# Patient Record
Sex: MALE | Race: WHITE | URBAN - METROPOLITAN AREA
[De-identification: names, ages, dates, MRNs, and addresses within clinical notes are randomized per-mention and may not be internally consistent; named-entity substitution may affect disease eponyms.]

---

## 2019-11-20 PROBLEM — 84089009 HIATAL HERNIA: Status: ACTIVE | Noted: 2019-11-20

## 2019-11-20 PROBLEM — 14304000 THYROID DISORDER: Status: ACTIVE | Noted: 2019-11-20

## 2019-11-20 PROBLEM — 90560007 GOUT: Status: ACTIVE | Noted: 2019-11-20

## 2020-09-23 ENCOUNTER — OFFICE VISIT (OUTPATIENT)
Dept: URBAN - METROPOLITAN AREA CLINIC 13 | Facility: CLINIC | Age: 70
End: 2020-09-23

## 2020-09-23 PROBLEM — 13644009 HYPERCHOLESTEROLEMIA: Status: ACTIVE | Noted: 2020-09-23

## 2020-09-24 ENCOUNTER — OFFICE VISIT (OUTPATIENT)
Dept: URBAN - METROPOLITAN AREA CLINIC 13 | Facility: CLINIC | Age: 70
End: 2020-09-24

## 2020-10-02 ENCOUNTER — OFFICE VISIT (OUTPATIENT)
Dept: URBAN - METROPOLITAN AREA SURGERY CENTER 28 | Facility: SURGERY CENTER | Age: 70
End: 2020-10-02

## 2020-10-02 PROBLEM — 428283002 HISTORY OF POLYP OF COLON: Status: ACTIVE | Noted: 2020-10-02

## 2020-10-02 PROBLEM — 90458007 INTERNAL HEMORRHOIDS: Status: ACTIVE | Noted: 2020-10-02

## 2020-10-02 LAB — PDFREPORT1: (no result)

## 2020-10-07 ENCOUNTER — LAB OUTSIDE AN ENCOUNTER (OUTPATIENT)
Dept: URBAN - METROPOLITAN AREA CLINIC 13 | Facility: CLINIC | Age: 70
End: 2020-10-07

## 2020-10-12 ENCOUNTER — OFFICE VISIT (OUTPATIENT)
Dept: URBAN - METROPOLITAN AREA CLINIC 13 | Facility: CLINIC | Age: 70
End: 2020-10-12

## 2021-01-07 ENCOUNTER — OFFICE VISIT (OUTPATIENT)
Dept: URBAN - METROPOLITAN AREA CLINIC 46 | Facility: CLINIC | Age: 71
End: 2021-01-07

## 2021-08-28 ENCOUNTER — TELEPHONE ENCOUNTER (OUTPATIENT)
Dept: URBAN - METROPOLITAN AREA CLINIC 13 | Facility: CLINIC | Age: 71
End: 2021-08-28

## 2021-08-28 RX ORDER — OMEPRAZOLE 40 MG/1
CAPSULE, DELAYED RELEASE ORAL
OUTPATIENT
End: 2021-01-07

## 2021-08-28 RX ORDER — LEVOTHYROXINE SODIUM 75 UG/1
TABLET ORAL
OUTPATIENT
End: 2020-09-23

## 2021-08-28 RX ORDER — FEBUXOSTAT 40 MG/1
TABLET ORAL
OUTPATIENT
End: 2020-09-23

## 2021-08-29 ENCOUNTER — TELEPHONE ENCOUNTER (OUTPATIENT)
Dept: URBAN - METROPOLITAN AREA CLINIC 13 | Facility: CLINIC | Age: 71
End: 2021-08-29

## 2021-08-29 RX ORDER — ATORVASTATIN CALCIUM 40 MG/1
TABLET ORAL
Status: ACTIVE | COMMUNITY

## 2021-08-29 RX ORDER — LEVOTHYROXINE SODIUM 75 UG/1
TABLET ORAL
Status: ACTIVE | COMMUNITY

## 2021-08-29 RX ORDER — FEBUXOSTAT 40 MG/1
TABLET, FILM COATED ORAL
Status: ACTIVE | COMMUNITY

## 2021-08-29 RX ORDER — OMEPRAZOLE 20 MG/1
TABLET, DELAYED RELEASE ORAL
Status: ACTIVE | COMMUNITY
Start: 2020-10-02

## 2021-08-29 RX ORDER — FERROUS SULFATE 324(65)MG
TABLET, DELAYED RELEASE (ENTERIC COATED) ORAL
Status: ACTIVE | COMMUNITY

## 2021-08-29 RX ORDER — ASPIRIN 81 MG/1
TABLET, COATED ORAL
Status: ACTIVE | COMMUNITY

## 2024-03-07 ENCOUNTER — OV EP (OUTPATIENT)
Dept: URBAN - METROPOLITAN AREA CLINIC 46 | Facility: CLINIC | Age: 74
End: 2024-03-07
Payer: MEDICARE

## 2024-03-07 ENCOUNTER — OV NP (OUTPATIENT)
Dept: URBAN - METROPOLITAN AREA CLINIC 46 | Facility: CLINIC | Age: 74
End: 2024-03-07

## 2024-03-07 VITALS
BODY MASS INDEX: 24.58 KG/M2 | HEART RATE: 83 BPM | SYSTOLIC BLOOD PRESSURE: 136 MMHG | WEIGHT: 175.6 LBS | TEMPERATURE: 97.7 F | DIASTOLIC BLOOD PRESSURE: 59 MMHG | HEIGHT: 71 IN

## 2024-03-07 DIAGNOSIS — K82.8 GALLBLADDER MASS: ICD-10-CM

## 2024-03-07 DIAGNOSIS — Z86.010 PERSONAL HISTORY OF COLONIC POLYPS: ICD-10-CM

## 2024-03-07 DIAGNOSIS — D50.8 OTHER IRON DEFICIENCY ANEMIA: ICD-10-CM

## 2024-03-07 DIAGNOSIS — Z87.11 PERSONAL HISTORY OF PEPTIC ULCER DISEASE: ICD-10-CM

## 2024-03-07 PROBLEM — 428283002: Status: ACTIVE | Noted: 2024-03-07

## 2024-03-07 PROCEDURE — 99215 OFFICE O/P EST HI 40 MIN: CPT | Performed by: INTERNAL MEDICINE

## 2024-03-07 RX ORDER — ASCORBIC ACID
2 LOZENGES CRYSTALS ORAL ONCE A DAY
Status: ACTIVE | COMMUNITY

## 2024-03-07 RX ORDER — FERROUS SULFATE 324(65)MG
1 TABLET TABLET, DELAYED RELEASE (ENTERIC COATED) ORAL
Status: ACTIVE | COMMUNITY

## 2024-03-07 RX ORDER — LEVOTHYROXINE SODIUM 75 UG/1
1 TABLET IN THE MORNING ON AN EMPTY STOMACH TABLET ORAL ONCE A DAY
Status: ACTIVE | COMMUNITY

## 2024-03-07 RX ORDER — ASPIRIN 81 MG/1
1 TABLET TABLET, COATED ORAL ONCE A DAY
Status: ACTIVE | COMMUNITY

## 2024-03-07 RX ORDER — ROSUVASTATIN CALCIUM 10 MG/1
1 TABLET TABLET, FILM COATED ORAL ONCE A DAY
Qty: 30 TABLET | Status: ACTIVE | COMMUNITY

## 2024-03-07 RX ORDER — FEBUXOSTAT 40 MG/1
1 TABLET TABLET ORAL ONCE A DAY
Qty: 90 TABLET | Status: ACTIVE | COMMUNITY

## 2024-03-07 NOTE — HPI-TODAY'S VISIT:
74 y/o male is being referred by Dr. 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 At OV with Dr. Romo pt was to start oral iron and also referred to hematology.  Initially, the patient presented to see our group with iron deficiency anemia and a  +FOBT with a Hb 8 on 9/2020. He had a colonoscopy 2/2020 with removal of multiple TA and SSA polyps with 3 year follow up advised. We performed an EGD/Colonoscopy on 10/2020 which revealed superficial ulcers in the antrum and likely were contributory to his anemia; colon 10/2020 with one small TA removed. We initiated PPI and Carafate and he mentions that he felt better with increased energy. Follow up labs 11/2020 revealed an improvement in Hb to 9.9. Hgb 8/2022 normalized at 14.2. Hgb 2/26/24 down to 7.5 and repeated yesterday, 3/6/24 was 7.1. CMP with Cr of 2.2; normal hepatic function. Iron panel 2/28/24 with iron of 33, saturation of 8, and ferritin of 23.8. He started oral iron yesterday. He has an appointment with Dr. Jackson 3/26/24. He denies any overt bleeding or GI distress. He has no abdominal pain, reflux, dysphagia, change in bowels. He has only felt fatigued and had pallor. He denies any NSAID use as he donated a kidney to a friend in 2014 and renal function has remained stable.   He was seen by Dr. Michael Ocampo 2/1/24 for evaluation of incidentally found gallbladder mass.    MRI abd with/without 1/26/24: IMPRESSION:1. 2.3 x 3.5 x 2.1 cm enhancing lesion in the gallbladder concerning forgallbladder neoplasm.2. No convincing evidence of metastatic disease.3. 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 Aneurysmal dilation of the celiac artery which was also seen on theprior CT 10/30/2023.  Per Dr. ocampo, mass is highly concerning for neoplastic process, will treat as such - open 4b/5 with portal lymphadenectomy. Surgery is tentatively scheduled for April 8th.  Cardiovascular history; seen by Dr. Sharma 2/20/24 for surgical clearance:  Complex patient with extensive prior thoracic aortic surgery for aortic dissection.  He has had his aortic valve replaced and his ascending aorta replaced as well as having a descending thoracic stent graft.  He has right subclavian bypass.  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  This extensive surgery was performed in 2011.  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  He has a suprarenal abdominal aortic aneurysm of 5 cm by the recent MRI.  This measured 5.6 cm by a prior CT a scan.  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   He had bypass surgery x 1 with a vein graft going to his right coronary artery in 2011.  He had a porcine aortic valve #27 freestyle placed 1/2011.  His last echocardiogram 5/2022 demonstrated grossly normal valve function with Portabelt visualization of the valve. Repeat echocardiogram scheduled for 3/26/24 due to symptoms of fatigue. He had a myocardial perfusion imaging scan done Tuesday with results pending.

## 2024-05-08 ENCOUNTER — TELEPHONE ENCOUNTER (OUTPATIENT)
Dept: URBAN - METROPOLITAN AREA CLINIC 46 | Facility: CLINIC | Age: 74
End: 2024-05-08

## 2024-05-08 RX ORDER — SOD SULF/POT CHLORIDE/MAG SULF 1.479 G
12 TABLETS THE FIRST DOSE THE EVENING BEFORE AND SECOND DOSE THE MORNING OF COLONOSCOPY TABLET ORAL TWICE A DAY
Qty: 24 | OUTPATIENT
Start: 2024-05-08 | End: 2024-05-09

## 2024-05-15 ENCOUNTER — OFFICE VISIT (OUTPATIENT)
Dept: URBAN - METROPOLITAN AREA SURGERY CENTER 28 | Facility: SURGERY CENTER | Age: 74
End: 2024-05-15

## 2024-05-22 ENCOUNTER — OFFICE VISIT (OUTPATIENT)
Dept: URBAN - METROPOLITAN AREA MEDICAL CENTER 35 | Facility: MEDICAL CENTER | Age: 74
End: 2024-05-22

## 2024-05-22 RX ORDER — LEVOTHYROXINE SODIUM 75 UG/1
1 TABLET IN THE MORNING ON AN EMPTY STOMACH TABLET ORAL ONCE A DAY
Status: ACTIVE | COMMUNITY

## 2024-05-22 RX ORDER — ATORVASTATIN CALCIUM 40 MG/1
TABLET ORAL
Status: ACTIVE | COMMUNITY

## 2024-05-22 RX ORDER — FEBUXOSTAT 40 MG/1
1 TABLET TABLET ORAL ONCE A DAY
Qty: 90 TABLET | Status: ACTIVE | COMMUNITY

## 2024-05-22 RX ORDER — ASCORBIC ACID
2 LOZENGES CRYSTALS ORAL ONCE A DAY
Status: ACTIVE | COMMUNITY

## 2024-05-22 RX ORDER — OMEPRAZOLE 20 MG/1
TABLET, DELAYED RELEASE ORAL
Status: ACTIVE | COMMUNITY
Start: 2020-10-02

## 2024-05-22 RX ORDER — ROSUVASTATIN CALCIUM 10 MG/1
1 TABLET TABLET, FILM COATED ORAL ONCE A DAY
Qty: 30 TABLET | Status: ACTIVE | COMMUNITY

## 2024-05-22 RX ORDER — FEBUXOSTAT 40 MG/1
TABLET, FILM COATED ORAL
Status: ACTIVE | COMMUNITY

## 2024-05-22 RX ORDER — ASPIRIN 81 MG/1
1 TABLET TABLET, COATED ORAL ONCE A DAY
Status: ACTIVE | COMMUNITY

## 2024-05-22 RX ORDER — FERROUS SULFATE 324(65)MG
1 TABLET TABLET, DELAYED RELEASE (ENTERIC COATED) ORAL
Status: ACTIVE | COMMUNITY

## 2024-08-15 ENCOUNTER — DASHBOARD ENCOUNTERS (OUTPATIENT)
Age: 74
End: 2024-08-15

## 2024-08-15 ENCOUNTER — OFFICE VISIT (OUTPATIENT)
Dept: URBAN - METROPOLITAN AREA CLINIC 48 | Facility: CLINIC | Age: 74
End: 2024-08-15
Payer: COMMERCIAL

## 2024-08-15 ENCOUNTER — LAB OUTSIDE AN ENCOUNTER (OUTPATIENT)
Dept: URBAN - METROPOLITAN AREA CLINIC 48 | Facility: CLINIC | Age: 74
End: 2024-08-15

## 2024-08-15 VITALS
DIASTOLIC BLOOD PRESSURE: 70 MMHG | HEIGHT: 71 IN | WEIGHT: 174.6 LBS | SYSTOLIC BLOOD PRESSURE: 145 MMHG | HEART RATE: 68 BPM | BODY MASS INDEX: 24.44 KG/M2 | TEMPERATURE: 98.1 F

## 2024-08-15 DIAGNOSIS — D50.8 OTHER IRON DEFICIENCY ANEMIA: ICD-10-CM

## 2024-08-15 DIAGNOSIS — K31.7 GASTRIC POLYPS: ICD-10-CM

## 2024-08-15 DIAGNOSIS — Z86.010 PERSONAL HISTORY OF COLONIC POLYPS: ICD-10-CM

## 2024-08-15 PROBLEM — 78809005: Status: ACTIVE | Noted: 2024-08-15

## 2024-08-15 PROCEDURE — 99214 OFFICE O/P EST MOD 30 MIN: CPT | Performed by: PHYSICIAN ASSISTANT

## 2024-08-15 RX ORDER — ASPIRIN 81 MG/1
1 TABLET TABLET, CHEWABLE ORAL ONCE A DAY
Status: ACTIVE | COMMUNITY

## 2024-08-15 RX ORDER — CHOLECALCIFEROL (VITAMIN D3) 1MM UNIT/G
AS DIRECTED LIQUID (ML) MISCELLANEOUS
Status: ACTIVE | COMMUNITY

## 2024-08-15 RX ORDER — LEVOTHYROXINE SODIUM 75 UG/1
1 TABLET IN THE MORNING ON AN EMPTY STOMACH TABLET ORAL ONCE A DAY
Status: ACTIVE | COMMUNITY

## 2024-08-15 RX ORDER — ROSUVASTATIN CALCIUM 10 MG/1
1 TABLET TABLET, FILM COATED ORAL ONCE A DAY
Qty: 90 TABLET | Status: ACTIVE | COMMUNITY

## 2024-08-15 RX ORDER — FERROUS SULFATE 325(65) MG
1 TABLET TABLET ORAL
Status: ACTIVE | COMMUNITY

## 2024-08-15 RX ORDER — FEBUXOSTAT 40 MG/1
1 TABLET TABLET ORAL ONCE A DAY
Qty: 90 TABLET | Status: ACTIVE | COMMUNITY

## 2024-08-15 NOTE — PHYSICAL EXAM SKIN:
-- DO NOT REPLY / DO NOT REPLY ALL --  -- Message is from [employer] ([facility]) --    General Patient Message:  Patient returning call to RN Connie, please assist as advised.     Caller Information         Type Contact Phone/Fax    12/22/2023 11:03 AM CST Phone (Outgoing) Kanika Turner (Self) [phone] (M)          Alternative phone number: no    Can a detailed message be left? Yes    Message Turnaround: [place]:    Refer to site's KB page for routing instructions    Please give this turnaround time to the caller:   \"You can expect to receive a response 1-3 business days after your provider's clinical team reviews the message\"               no rashes , no jaundice present , good turgor , no masses , no tenderness on palpation

## 2024-08-15 NOTE — HPI-TODAY'S VISIT:
73 y/o male is being referred by Dr. Jackson for follow up on persistnet RAHEEM, s/p EGD/Colon 5/22/24. Initially, the patient presented to see our group with iron deficiency anemia and a  +FOBT with a Hb 8 on 9/2020. He had a colonoscopy 2/2020 with removal of multiple TA and SSA polyps with 3 year follow up advised. We performed an EGD/Colonoscopy on 10/2020 which revealed superficial ulcers in the antrum and likely were contributory to his anemia; colon 10/2020 with one small TA removed. We initiated PPI and Carafate and he mentioned that he felt better with increased energy. Follow up labs 11/2020 revealed an improvement in Hb to 9.9. Hgb 8/2022 normalized at 14.2. Hgb 2/26/24 down to 7.5 and repeated 3/6/24 was 7.1. CMP with Cr of 2.2; normal hepatic function. Iron panel 2/28/24 with iron of 33, saturation of 8, and ferritin of 23.8. Most recent labs 5/7/24-->6/11/24-->8/1/24: Hgb 11.3-->12-->10.4; iron 44-->77-->53; saturation 13-->26-->18. Per review of Dr. Jackson's note, he was having some intolerance to oral iron and IV iron was ordered. He denies any overt bleeding or GI distress. Anemia is felt to be contributed to by CKD. He previously denied abdominal pain, reflux, dysphagia, change in bowels. He did c/o pallor and fatigue. He denies any NSAID use as he donated a kidney to a friend in 2014 and renal function has remained stable.   EGD/Colonoscopy 5/22/24 showed gastritis, two 2-4 mm gastric polyps (path showed fragments of hyperplastic polyps with marked cautery artifact, surface ulceration, and associated granulation tissue; suggested intestinal metaplasia), diverticulosis, venous blebs in the sigmoid. There was no clear endoscopic etiology of GI loss. Plan was for outpatient VCE.   Today, he has no new complaints, and still has no overt bleeding. He continues to feel fatigued. He got IV iron twice prior to gallbladder surgery, and again 2 days ago. He received 3U PRBC for blood loss in June from gallbladder surgery prior to discharge.   He was seen by Dr. Michael Ocampo 2/1/24 for evaluation of incidentally found gallbladder mass.    MRI abd with/without 1/26/24: IMPRESSION:1. 2.3 x 3.5 x 2.1 cm enhancing lesion in the gallbladder concerning forgallbladder neoplasm.2. No convincing evidence of metastatic disease.3. 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 Aneurysmal dilation of the celiac artery which was also seen on theprior CT 10/30/2023.  He underwent surgical resection by Dr. Ocampo in June. 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 He saw Dr. Freeman, surgical oncoligist, 7/9/24, and patient was to remain on Lovenox for 30 days, resume aspirin, and follow up PRN.    Cardiovascular history; seen by Dr. Sharma 2/20/24 for surgical clearance:  Complex patient with extensive prior thoracic aortic surgery for aortic dissection.  He has had his aortic valve replaced and his ascending aorta replaced as well as having a descending thoracic stent graft.  He has right subclavian bypass.  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  This extensive surgery was performed in 2011.  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  He has a suprarenal abdominal aortic aneurysm of 5 cm by the recent MRI.  This measured 5.6 cm by a prior CT a scan.  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   He had bypass surgery x 1 with a vein graft going to his right coronary artery in 2011.  He had a porcine aortic valve #27 freestyle placed 1/2011.  His last echocardiogram 5/2022 demonstrated grossly normal valve function with Portabelt visualization of the valve. Repeat echocardiogram scheduled for 3/26/24 due to symptoms of fatigue. He had a myocardial perfusion imaging scan done Tuesday with results pending.

## 2024-09-03 ENCOUNTER — OFFICE VISIT (OUTPATIENT)
Dept: URBAN - METROPOLITAN AREA CLINIC 43 | Facility: CLINIC | Age: 74
End: 2024-09-03
Payer: COMMERCIAL

## 2024-09-03 VITALS
WEIGHT: 168 LBS | HEIGHT: 71 IN | BODY MASS INDEX: 23.52 KG/M2 | DIASTOLIC BLOOD PRESSURE: 81 MMHG | TEMPERATURE: 98.1 F | SYSTOLIC BLOOD PRESSURE: 130 MMHG | HEART RATE: 106 BPM

## 2024-09-03 DIAGNOSIS — D50.9 ANEMIA: ICD-10-CM

## 2024-09-03 PROCEDURE — 91110 GI TRC IMG INTRAL ESOPH-ILE: CPT | Performed by: INTERNAL MEDICINE

## 2024-09-03 RX ORDER — FEBUXOSTAT 40 MG/1
1 TABLET TABLET ORAL ONCE A DAY
Qty: 90 TABLET | Status: ACTIVE | COMMUNITY

## 2024-09-03 RX ORDER — ASPIRIN 81 MG/1
1 TABLET TABLET, CHEWABLE ORAL ONCE A DAY
Status: ACTIVE | COMMUNITY

## 2024-09-03 RX ORDER — CHOLECALCIFEROL (VITAMIN D3) 1MM UNIT/G
AS DIRECTED LIQUID (ML) MISCELLANEOUS
Status: ACTIVE | COMMUNITY

## 2024-09-03 RX ORDER — FERROUS SULFATE 325(65) MG
1 TABLET TABLET ORAL
Status: ACTIVE | COMMUNITY

## 2024-09-03 RX ORDER — LEVOTHYROXINE SODIUM 75 UG/1
1 TABLET IN THE MORNING ON AN EMPTY STOMACH TABLET ORAL ONCE A DAY
Status: ACTIVE | COMMUNITY

## 2024-09-03 RX ORDER — ROSUVASTATIN CALCIUM 10 MG/1
1 TABLET TABLET, FILM COATED ORAL ONCE A DAY
Qty: 90 TABLET | Status: ACTIVE | COMMUNITY

## 2024-09-11 ENCOUNTER — ERX REFILL RESPONSE (OUTPATIENT)
Dept: URBAN - METROPOLITAN AREA CLINIC 46 | Facility: CLINIC | Age: 74
End: 2024-09-11

## 2024-09-11 RX ORDER — SODIUM SULFATE, MAGNESIUM SULFATE, AND POTASSIUM CHLORIDE 17.75; 2.7; 2.25 G/1; G/1; G/1
TAKE 12 TABLETS BY MOUTH THE EVENING BEFORE THE COLONOSCOPY AND 12 TABLETS BY MOUTH THE MORNING OF THE COLONOSCOPY TABLET ORAL
Qty: 24 TABLET | Refills: 0 | OUTPATIENT

## 2024-09-19 ENCOUNTER — TELEPHONE ENCOUNTER (OUTPATIENT)
Dept: URBAN - METROPOLITAN AREA CLINIC 48 | Facility: CLINIC | Age: 74
End: 2024-09-19

## 2024-09-23 ENCOUNTER — OFFICE VISIT (OUTPATIENT)
Dept: URBAN - METROPOLITAN AREA CLINIC 48 | Facility: CLINIC | Age: 74
End: 2024-09-23
Payer: COMMERCIAL

## 2024-09-23 VITALS
TEMPERATURE: 98.1 F | OXYGEN SATURATION: 98 % | WEIGHT: 171 LBS | BODY MASS INDEX: 23.94 KG/M2 | HEIGHT: 71 IN | DIASTOLIC BLOOD PRESSURE: 81 MMHG | SYSTOLIC BLOOD PRESSURE: 130 MMHG | HEART RATE: 87 BPM

## 2024-09-23 DIAGNOSIS — K31.7 GASTRIC POLYPS: ICD-10-CM

## 2024-09-23 DIAGNOSIS — K55.20 ANGIODYSPLASIA OF SMALL INTESTINE: ICD-10-CM

## 2024-09-23 DIAGNOSIS — D50.8 ACHLORHYDRIC ANEMIA: ICD-10-CM

## 2024-09-23 DIAGNOSIS — Z86.010 PERSONAL HISTORY OF COLONIC POLYPS: ICD-10-CM

## 2024-09-23 PROCEDURE — 99214 OFFICE O/P EST MOD 30 MIN: CPT | Performed by: INTERNAL MEDICINE

## 2024-09-23 RX ORDER — ROSUVASTATIN CALCIUM 10 MG/1
1 TABLET TABLET, FILM COATED ORAL ONCE A DAY
Qty: 90 TABLET | Status: ACTIVE | COMMUNITY

## 2024-09-23 RX ORDER — CHOLECALCIFEROL (VITAMIN D3) 1MM UNIT/G
AS DIRECTED LIQUID (ML) MISCELLANEOUS
Status: ACTIVE | COMMUNITY

## 2024-09-23 RX ORDER — SODIUM SULFATE, MAGNESIUM SULFATE, AND POTASSIUM CHLORIDE 17.75; 2.7; 2.25 G/1; G/1; G/1
TAKE 12 TABLETS BY MOUTH THE EVENING BEFORE THE COLONOSCOPY AND 12 TABLETS BY MOUTH THE MORNING OF THE COLONOSCOPY TABLET ORAL
Qty: 24 TABLET | Refills: 0 | Status: ACTIVE | COMMUNITY

## 2024-09-23 RX ORDER — FEBUXOSTAT 40 MG/1
1 TABLET TABLET ORAL ONCE A DAY
Qty: 90 TABLET | Status: ACTIVE | COMMUNITY

## 2024-09-23 RX ORDER — ASPIRIN 81 MG/1
1 TABLET TABLET, CHEWABLE ORAL ONCE A DAY
Status: ACTIVE | COMMUNITY

## 2024-09-23 RX ORDER — FERROUS SULFATE 325(65) MG
1 TABLET TABLET ORAL
Status: ACTIVE | COMMUNITY

## 2024-09-23 RX ORDER — LEVOTHYROXINE SODIUM 75 UG/1
1 TABLET IN THE MORNING ON AN EMPTY STOMACH TABLET ORAL ONCE A DAY
Status: ACTIVE | COMMUNITY

## 2024-09-23 NOTE — HPI-TODAY'S VISIT:
73 y/o male is being referred by Dr. Jackson for follow up on persistent RAHEEM, s/p EGD/Colon 5/22/24. Initially, the patient presented to see our group with iron deficiency anemia and a  +FOBT with a Hb 8 on 9/2020. He had a colonoscopy 2/2020 with removal of multiple TA and SSA polyps with 3 year follow up advised. We performed an EGD/Colonoscopy on 10/2020 which revealed superficial ulcers in the antrum and likely were contributory to his anemia; colon 10/2020 with one small TA removed. We initiated PPI and Carafateand he mentioned that he felt better with increased energy. Follow up labs 11/2020 revealed an improvement in Hb to 9.9. Hgb 8/2022 normalized at 14.2. Hgb 2/26/24 down to 7.5 and repeated 3/6/24 was 7.1. CMP with Cr of 2.2; normal hepatic function. Iron panel 2/28/24 with iron of 33, saturation of 8, and ferritin of 23.8. Most recent labs 5/7/24-->6/11/24-->8/1/24: Hgb 11.3-->12-->10.4; iron 44-->77-->53; saturation 13-->26-->18. Per review of Dr. Jackson's note, he was having some intolerance to oral iron and IV iron was ordered. He denies any overt bleeding or GI distress. Anemia is felt to be contributed to by CKD. He previously denied abdominal pain, reflux, dysphagia, change in bowels. He did c/o pallor and fatigue. He denies any NSAID use as he donated a kidney to a friend in 2014 and renal function has remained stable.   EGD/Colonoscopy 5/22/24 showed gastritis, two 2-4 mm gastric polyps (path showed fragments of hyperplastic polyps with marked cautery artifact, surface ulceration, and associated granulation tissue; suggested intestinal metaplasia), diverticulosis, venous blebs in the sigmoid. There was no clear endoscopic etiology of GI loss. Plan was for outpatient VCE, which was completed 9/3/24 and showed 2 moderate angioectasias in the jejunum, likely contributing to the patient's anemia.   Today, he has no new complaints, and still has no overt bleeding. He has gotten IV iron 3 times this year, the last time being about 7 weeks ago. He has follow up with labs tomorrow with hematology. He does feel improved since his most recenet IV iron infusions. He received 3U PRBC for blood loss in June from gallbladder surgery prior to discharge.   He was seen by Dr. Michael Ocampo 2/1/24 for evaluation of incidentally found gallbladder mass.    MRI abd with/without 1/26/24: IMPRESSION:1. 2.3 x 3.5 x 2.1 cm enhancing lesion in the gallbladder concerning for gallbladder neoplasm.2. No convincing evidence of metastatic disease.3. 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 Aneurysmal dilation of the celiac artery which was also seen on the prior CT 10/30/2023.  He underwent surgical resection by Dr. Ocampo in June. Prior to his surgery, he saw Dr. Sharma in June of this year and felt to be low- moderate appropriate risk for a intraperitoneal surgery. 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 He saw Dr. Freeman, surgical oncologist, 7/9/24, and patient was to remain on Lovenox for 30 days, resume aspirin, and follow up PRN.  He is now in the process of being referred to a new vascular surgeon for repair of aortic aneurysm; states most recent imaging showed measurement of 5.8 cm. His current surgeon, Dr. Michael Henderson, is not comfortable to do the procedure as the patient has one kidney (donated one in the past), given the location and potential risk to compromising the 1 kidney he has.   Cardiovascular history; seen by Dr. Sharma 2/20/24 for surgical clearance:  Complex patient with extensive prior thoracic aortic surgery for aortic dissection.  He has had his aortic valve replacedand his ascending aorta replaced as well as having a descending thoracic stent graft.  He has right subclavian bypass.  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  This extensive surgery was performed in 2011.  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  He has a suprarenal abdominal aortic aneurysm of 5 cm by the recent MRI.  This measured 5.6 cm by a prior CT a scan.  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   He had bypass surgery x 1 with a vein graft going to his right coronary artery in 2011.  He had a porcine aortic valve #27 freestyle placed 1/2011.  His last echocardiogram 5/2022 demonstrated grossly normal valve function with Portabelt visualization of the valve. Repeat echocardiogram scheduled for 3/26/24 due to symptoms of fatigue. He had a myocardial perfusion imaging scan done Tuesday with results pending.

## 2024-09-26 ENCOUNTER — TELEPHONE ENCOUNTER (OUTPATIENT)
Dept: URBAN - METROPOLITAN AREA CLINIC 48 | Facility: CLINIC | Age: 74
End: 2024-09-26

## 2024-10-07 ENCOUNTER — WEB ENCOUNTER (OUTPATIENT)
Dept: URBAN - METROPOLITAN AREA CLINIC 46 | Facility: CLINIC | Age: 74
End: 2024-10-07

## 2025-01-02 ENCOUNTER — TELEPHONE ENCOUNTER (OUTPATIENT)
Dept: URBAN - METROPOLITAN AREA CLINIC 46 | Facility: CLINIC | Age: 75
End: 2025-01-02

## 2025-01-08 ENCOUNTER — OFFICE VISIT (OUTPATIENT)
Dept: URBAN - METROPOLITAN AREA MEDICAL CENTER 35 | Facility: MEDICAL CENTER | Age: 75
End: 2025-01-08